# Patient Record
Sex: FEMALE | Race: ASIAN | HISPANIC OR LATINO | Employment: UNEMPLOYED | URBAN - METROPOLITAN AREA
[De-identification: names, ages, dates, MRNs, and addresses within clinical notes are randomized per-mention and may not be internally consistent; named-entity substitution may affect disease eponyms.]

---
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
## 2025-02-05 ENCOUNTER — TELEPHONE (OUTPATIENT)
Dept: FAMILY MEDICINE CLINIC | Facility: CLINIC | Age: 20
End: 2025-02-05
Payer: OTHER GOVERNMENT

## 2025-02-05 NOTE — TELEPHONE ENCOUNTER
Caller: SIA WANG    Relationship: Mother    Best call back number: [phone]     What form or medical record are you requesting: NOTE FOR SCHOOL COVERING 24-2/7    Who is requesting this form or medical record from you: SIA    How would you like to receive the form or medical records (pick-up, mail, fax):     Timeframe paperwork needed: AS SOON AS POSSIBLE    Additional notes: PATIENT WENT TO THE [facility] ON MONDAY. THEY TESTED HER FOR STREP. SHE HAD A SORE THROAT-COUGH AND LOW GRADE TEMP. SIA TESTED HER YESTERDAY AND SHE WAS POSITIVE FOR FLU A.

## 2025-03-02 RX ORDER — EPINEPHRINE 0.3 MG/.3ML
0.3 INJECTION SUBCUTANEOUS ONCE
Qty: 2 EACH | Refills: 0 | Status: SHIPPED | OUTPATIENT
Start: 2025-03-02 | End: 2025-03-02

## 2025-04-18 ENCOUNTER — OFFICE VISIT (OUTPATIENT)
Dept: FAMILY MEDICINE CLINIC | Facility: CLINIC | Age: 20
End: 2025-04-18
Payer: OTHER GOVERNMENT

## 2025-04-18 ENCOUNTER — TELEPHONE (OUTPATIENT)
Dept: FAMILY MEDICINE CLINIC | Facility: CLINIC | Age: 20
End: 2025-04-18

## 2025-04-18 VITALS
OXYGEN SATURATION: 100 % | TEMPERATURE: 98.2 F | RESPIRATION RATE: 16 BRPM | SYSTOLIC BLOOD PRESSURE: 118 MMHG | BODY MASS INDEX: 27.79 KG/M2 | HEART RATE: 94 BPM | WEIGHT: 162.8 LBS | DIASTOLIC BLOOD PRESSURE: 66 MMHG | HEIGHT: 64 IN

## 2025-04-18 DIAGNOSIS — Z91.018 FOOD ALLERGY: Primary | ICD-10-CM

## 2025-04-18 DIAGNOSIS — Z30.40 ENCOUNTER FOR SURVEILLANCE OF CONTRACEPTIVES, UNSPECIFIED CONTRACEPTIVE: ICD-10-CM

## 2025-04-18 RX ORDER — EPINEPHRINE 0.3 MG/.3ML
INJECTION SUBCUTANEOUS
Qty: 2 EACH | Refills: 0 | Status: SHIPPED | OUTPATIENT
Start: 2025-04-18

## 2025-04-18 RX ORDER — NORELGESTROMIN AND ETHINYL ESTRADIOL 35; 150 UG/MG; UG/MG
1 PATCH TRANSDERMAL WEEKLY
Qty: 9 PATCH | Refills: 3 | Status: SHIPPED | OUTPATIENT
Start: 2025-04-18

## 2025-04-18 NOTE — LETTER
April 18, 2025     Patient: Iris Moore   YOB: 2005   Date of Visit: 4/18/2025       To Whom it May Concern:    Due to food allergy, it is requested that she reside alone to avoid possible exposure to allergen.          Sincerely,          Stephanie Peña DO        CC: No Recipients

## 2025-04-18 NOTE — PROGRESS NOTES
Chief Complaint  Allergies (Requesting Epi-pen, letter for dairy allergy)    Subjective          Iris Moore presents to [facility]    History of Present Illness  The patient presents for evaluation of allergies and medication refill.    Chief complaint includes documented allergies to dairy and gluten, which manifest as skin reactions upon contact with the allergens. No anaphylactic reactions such as shortness of breath or oral swelling have been experienced, but caution is maintained regarding diet and avoiding physical contact with allergen. Concerns are expressed about potential exposure to allergens through her roommate's dietary habits and physical contact post-consumption. Accommodation for a private room is being sought due to these allergy-related skin issues. She is attending college at [employer].   [facility]. The EpiPen sent on 03/01/2025 was not received.    A refill of the contraceptive patch is requested, with the most recent menstrual cycle having started yesterday.   Preference is given to the patch over other forms of contraception.    GYNECOLOGICAL HISTORY:  - Last Menstrual Period: 04/17/2025    The following portions of the patient's history were reviewed and updated as appropriate: allergies, current medications, past family history, past medical history, past social history, past surgical history, and problem list.    Objective      Physical Exam  Vitals and nursing note reviewed.   Constitutional:       Appearance: Normal appearance.   Pulmonary:      Effort: Pulmonary effort is normal. No respiratory distress.   Neurological:      Mental Status: She is alert.   Psychiatric:         Mood and Affect: Mood normal.        Physical Exam      Result Review :       Results               Assessment and Plan    Diagnoses and all orders for this visit:    1. Food allergy (Primary)  -     EPINEPHrine (EpiPen 2-Darren)  0.3 MG/0.3ML solution auto-injector injection; Use as directed  Dispense: 2 each; Refill: 0    2. Encounter for surveillance of contraceptives, unspecified contraceptive  -     norelgestromin-ethinyl estradiol (ORTHO EVRA) 150-35 MCG/24HR; Place 1 patch on the skin as directed by provider 1 (One) Time Per Week.  Dispense: 9 patch; Refill: 3      Assessment & Plan  1. Allergies.  - History of allergic reactions to dairy and gluten, causing skin reactions. Very sensitive to exposure.  - No history of anaphylaxis, but there is concern about potential exposure from her roommate and worsening reaction.  - An EpiPen prescription will be sent to [facility] in [place], as precaution. Educated on when to use.  - A letter will be provided to her educational institution, recommending a single-occupancy room to minimize exposure to allergens.    2. Medication management.  - Currently using a contraceptive patch and prefers it over other methods.  - Last menstrual cycle started on 04/17/2025.  - A refill for the contraceptive patch will be sent to [facility] in [place].  - Monitoring for any issues with medication availability at the pharmacy.        Follow Up   Return in about 1 year (around 4/18/2026) for Annual physical.  Patient or patient representative verbalized consent for the use of Ambient Listening during the visit with  Stephanie Peña DO for chart documentation. 4/18/2025  09:13 EDT    Patient was given instructions and counseling regarding her condition or for health maintenance advice. Please see specific information pulled into the AVS if appropriate.

## 2025-08-17 DIAGNOSIS — L30.9 ECZEMA, UNSPECIFIED TYPE: Primary | ICD-10-CM

## 2025-08-27 ENCOUNTER — TELEPHONE (OUTPATIENT)
Dept: FAMILY MEDICINE CLINIC | Facility: CLINIC | Age: 20
End: 2025-08-27
Payer: OTHER GOVERNMENT

## 2025-08-27 DIAGNOSIS — L30.9 ECZEMA, UNSPECIFIED TYPE: ICD-10-CM

## 2025-08-27 DIAGNOSIS — T78.1XXA FOOD SENSITIVITY WITH GASTROINTESTINAL SYMPTOMS: Primary | ICD-10-CM
